# Patient Record
Sex: MALE | Race: WHITE | Employment: FULL TIME | URBAN - METROPOLITAN AREA
[De-identification: names, ages, dates, MRNs, and addresses within clinical notes are randomized per-mention and may not be internally consistent; named-entity substitution may affect disease eponyms.]

---

## 2021-06-07 ENCOUNTER — APPOINTMENT (OUTPATIENT)
Dept: GENERAL RADIOLOGY | Age: 36
End: 2021-06-07

## 2021-06-07 ENCOUNTER — HOSPITAL ENCOUNTER (EMERGENCY)
Age: 36
Discharge: HOME | End: 2021-06-07
Attending: EMERGENCY MEDICINE

## 2021-06-07 VITALS
DIASTOLIC BLOOD PRESSURE: 99 MMHG | WEIGHT: 220 LBS | TEMPERATURE: 97.5 F | HEART RATE: 93 BPM | OXYGEN SATURATION: 97 % | RESPIRATION RATE: 18 BRPM | BODY MASS INDEX: 29.16 KG/M2 | HEIGHT: 73 IN | SYSTOLIC BLOOD PRESSURE: 148 MMHG

## 2021-06-07 DIAGNOSIS — S69.91XA HAND TRAUMA, RIGHT, INITIAL ENCOUNTER: Primary | ICD-10-CM

## 2021-06-07 DIAGNOSIS — S62.336A CLOSED DISPLACED FRACTURE OF NECK OF FIFTH METACARPAL BONE OF RIGHT HAND, INITIAL ENCOUNTER: ICD-10-CM

## 2021-06-07 DIAGNOSIS — S62.339A CLOSED BOXER'S FRACTURE, INITIAL ENCOUNTER: ICD-10-CM

## 2021-06-07 PROCEDURE — 73130 X-RAY EXAM OF HAND: CPT

## 2021-06-07 PROCEDURE — 99283 EMERGENCY DEPT VISIT LOW MDM: CPT

## 2021-06-07 ASSESSMENT — PAIN DESCRIPTION - PAIN TYPE: TYPE: ACUTE PAIN

## 2021-06-07 ASSESSMENT — PAIN DESCRIPTION - LOCATION: LOCATION: HAND

## 2021-06-07 ASSESSMENT — PAIN SCALES - GENERAL: PAINLEVEL_OUTOF10: 5

## 2021-06-07 ASSESSMENT — PAIN DESCRIPTION - FREQUENCY: FREQUENCY: CONTINUOUS

## 2021-06-07 ASSESSMENT — PAIN DESCRIPTION - DESCRIPTORS: DESCRIPTORS: ACHING

## 2021-06-07 ASSESSMENT — PAIN DESCRIPTION - ORIENTATION: ORIENTATION: RIGHT

## 2021-06-07 NOTE — ED PROVIDER NOTES
Emergency Department Encounter    Patient: Brock Scott  MRN: [id number]  : 1985  Date of Evaluation: 2021  ED Provider:  Festus Tavares DO    Triage Chief Complaint:   Hand Injury (reports of punchind a wall yesterday )      [place]:  Brock Scott is a 30 y.o. male that presents to the emergency department for evaluation of right hand pain. Patient notes that yesterday, he was upset and punched a wall. Since then, he has had pain in his hand proximal to the pinky finger. Denies numbness, tingling, paresthesias. Does admit to swelling. No open fractures or deformities. Denies previous history of similar symptoms. Is concerned about fracture. Denies pain or injury elsewhere. Denies additional precipitating, modifying, alleviating features. ROS - see HPI, below listed is current ROS at time of my eval:  General:  No fevers  Musculoskeletal: Right hand pain  Skin:  No rash, no pruritis, no easy bruising  Neurologic:   no extremity numbness, no extremity tingling, no extremity weakness  Extremities: No cyanosis    No past medical history on file. Past Surgical History:   Procedure Laterality Date    ANKLE SURGERY Left        No family history on file. Social History     Socioeconomic History    Marital status: Not on file     Spouse name: Not on file    Number of children: Not on file    Years of education: Not on file    Highest education level: Not on file   Occupational History    Not on file   Tobacco Use    Smoking status: Current Every Day Smoker     Packs/day: 2.00     Types: Cigarettes    Smokeless tobacco: Never Used   Substance and Sexual Activity    Alcohol use:  Yes     Alcohol/week: 12.0 standard drinks     Types: 12 Cans of beer per week    Drug use: Yes     Types: Marijuana    Sexual activity: Not on file   Other Topics Concern    Not on file   Social History Narrative    Not on file     Social Determinants of Health     Financial Resource Strain:    [employer] Difficulty of Paying Living Expenses:    Food Insecurity:     Worried About Running Out of Food in the Last Year:     [address] in the Last Year:    Transportation Needs:     Lack of Transportation (Medical):  Lack of Transportation (Non-Medical):    Physical Activity:     Days of Exercise per Week:     Minutes of Exercise per Session:    Stress:     Feeling of Stress :    Social Connections:     Frequency of Communication with Friends and Family:     Frequency of Social Gatherings with Friends and Family:     Attends [employer] Services:     Active Member of Clubs or Organizations:     Attends Club or Organization Meetings:     Marital Status:    Intimate Partner Violence:     Fear of Current or Ex-Partner:     Emotionally Abused:     Physically Abused:     Sexually Abused:        No current facility-administered medications for this encounter. No current outpatient medications on file. No Known Allergies    Nursing Notes Reviewed    Physical Exam:  Triage VS:    ED Triage Vitals [06/07/21 1340]   Enc Vitals Group      BP (!) 148/99      Pulse 93      Resp 18      Temp 97.5 °F (36.4 °C)      Temp Source Infrared      SpO2 97 %      Weight 220 lb (99.8 kg)      Height 6' 1\" (1.854 m)       General appearance: Following commands and answering questions. GCS 15. Nontoxic in appearance. Skin:  Warm. Dry. Intact. No lacerations, abrasions, open fractures. Heart: Regular rate and rhythm. Audible S1 and S2. No audible murmurs, rubs, gallops. Lungs: Clear to auscultation bilaterally. Extremity: No clubbing or cyanosis. Examination is focused on the right upper extremity. No pain or injury over the shoulder, elbow, forearm, wrist.  There is swelling proximal to the right fifth digit on the dorsal surface. No open fractures, lacerations, abrasions. Pain with palpation over the fifth metacarpal distal shaft and neck. No skin crepitus. No subcutaneous emphysema.   Mild pain with movement of the right fourth and fifth digits. Other digits are able to move and full range of motion. There is no pain over the anatomic snuffbox. There is no pain over the scapholunate ligament. Neurological:  Alert and oriented times 3. Sensation is intact to light touch and two-point discrimination in the C5-T1 dermatomal distribution of bilateral upper extremities. 5/5 strength in muscles innervated by the radial, median, ulnar, AIN, PIN nerves. 2/4 biceps and triceps deep tendon reflexes. Perfusion:  2/4 radial and ulnar pulses in the upper extremities. Compartments are soft compressible. No signs of compartment syndrome. Capillary refill is brisk and less than 2 seconds. I have reviewed and interpreted all of the currently available diagnostic results from this visit     Radiographs:  Radiologist's Report Reviewed:  XR HAND RIGHT (MIN 3 VIEWS)    Result Date: 6/7/2021  EXAMINATION: THREE XRAY VIEWS OF THE RIGHT HAND 6/7/2021 1:40 pm COMPARISON: None HISTORY: ORDERING SYSTEM PROVIDED HISTORY: pain TECHNOLOGIST PROVIDED HISTORY: Reason for exam:->pain FINDINGS: There is an acute comminuted right 5th metacarpal neck fracture. There is dorsal apex angulation and overlying soft tissue swelling. No additional fracture is appreciated. Acute comminuted right metacarpal neck fracture with dorsal apex angulation. Overlying soft tissue swelling. MDM:  Patient was seen and evaluated in the emergency department by myself. A thorough history and physical exam were performed, prior medical records reviewed. Upon arrival, patient's vital signs were noted patient is hemodynamically stable. Right hand x-ray radiology report reads acute comminuted right metacarpal neck fracture with dorsal apex angulation. Overlying soft tissue swelling. Results of radiographic data along differential diagnosis and treatment plan discussed with the patient.   Presents with right hand fifth metacarpal pain, Emergency [address]  [phone]  Go to   Immediately with any new, worsening, concerning symptoms. Demar Alejo DO  [employer]  [phone]    In 1 week  please follow=up with orthopedic hand surgeon for re-evaluation. Please rest, ice, compress, elevated extremity. No weight bearing. keep splint clean, dry, intact. ED Provider Disposition:  DISPOSITION Decision To Discharge 06/07/2021 02:09:58 PM      Comment: Please note this report has been produced using speech recognition software and may contain errors related to that system including errors in grammar, punctuation, and spelling, as well as words and phrases that may be inappropriate. If there are any questions or concerns please feel free to contact the dictating provider for clarification.        Stephani Grullon,   06/07/21 3138

## 2024-07-02 ENCOUNTER — TELEPHONE (OUTPATIENT)
Dept: FAMILY MEDICINE CLINIC | Age: 39
End: 2024-07-02

## 2024-07-02 NOTE — TELEPHONE ENCOUNTER
----- Message from Brijesh Negron sent at 7/2/2024  4:22 PM EDT -----  Regarding: [facility] Appointment Request  [facility] Appointment Request    Patient needs appointment for [facility] Appointment Type: New Patient.    Patient Requested Dates(s):As soon as possible, Tuesday and Wednesday.  Patient Requested Time: Any time  Provider Name: Pratik Teague    Reason for Appointment Request: New Patient - Requested Provider unavailable  --------------------------------------------------------------------------------------------------------------------------    Relationship to Patient: Self     Call Back Information: OK to leave message on voicemail  Preferred Call Back Number: Phone [phone]